# Patient Record
(demographics unavailable — no encounter records)

---

## 2025-01-27 NOTE — HISTORY OF PRESENT ILLNESS
[de-identified] : 20F who presents with her brother today. Her father wanted her to undergo an ENT exam. She has chronic nasal congestion and lots of production of mucous in the nose. She has a dog at home but is unsure if she has any nasal allergies. She uses azelastine which seems to help. No purulent rhinorrhea, facial pain/pressure, anosmia. No ear or throat complaints.

## 2025-01-27 NOTE — PHYSICAL EXAM
[TextEntry] : General: AAO, no significant distress Psychiatric: affect pleasant and within normal limits Eyes: relatively symmetric, no obvious nystagmus Skin: no significant lesions on face Nose: no significant lesions; patent. Oral Cavity & Oropharynx: no significant deformity or lesions Neck/Lymphatics: no significant masses or abnormal cervical nodes palpated Respiratory: breathing comfortably; no significant distress Neurologic: cranial nerves II-XII grossly intact; EOMI Facial function: symmetric   Ear examination performed under binocular otologic microscope: Left Ear External: Pinna and periauricular area is normal. Canal: Ear canal skin is not inflamed or edematous. Tympanic Membrane: Intact and in good position.   Right Ear External: Pinna and periauricular area is normal. Canal: Ear canal skin is not inflamed or edematous. Tympanic Membrane: Intact and in good position.  Nasal Endoscopy:   Pre-operative Diagnosis: allergic rhinitis Post-operative Diagnosis: same Anesthesia: None Procedure: Bilateral nasal endoscopy Procedure Details:   The patient was placed in the seated position sitting straight up. The telescope was passed along the left nasal floor to the nasopharynx. It was then passed into the region of the middle meatus, middle turbinate, and the sphenoethmoid region. An identical procedure was performed on the right side.  Findings: Interior nasal cavity: normal bilaterally Middle and superior meatus: normal bilaterally Sphenoethmoidal recess: normal bilaterally Mucosa: normal bilaterally Nasal septum: normal Discharge: none bilaterally Turbinates: ENLARGED AND BOGGY INFERIOR TURBINATES BILATERALLY Adenoid: normal bilaterally Posterior choanae: normal bilaterally Eustachian tubes: normal bilaterally Mucous stranding: COPIOUS SECRETIONS AND STRANDING BILATERALLY Lesions: Not present    Comments:   Condition: Stable. Patient tolerated procedure well.

## 2025-01-27 NOTE — ASSESSMENT
[FreeTextEntry1] : Allergic Rhinitis - 1 chronic issue with exacerbation - continue Azelastine 1 spray to each nostril bid -> sent to pharmacy - discussed the risks of Azelastine or Dymista spray which include but are not limited to: epistaxis, dry nose, pharyngitis, nasal irritation/pain, headache, sore throat, congestion, sinusitis, rhinorrhea - f/u as needed